# Patient Record
Sex: FEMALE | Race: WHITE
[De-identification: names, ages, dates, MRNs, and addresses within clinical notes are randomized per-mention and may not be internally consistent; named-entity substitution may affect disease eponyms.]

---

## 2017-09-28 ENCOUNTER — HOSPITAL ENCOUNTER (EMERGENCY)
Dept: HOSPITAL 9 - MADERS | Age: 77
Discharge: HOME | End: 2017-09-28
Payer: MEDICARE

## 2017-09-28 DIAGNOSIS — I10: ICD-10-CM

## 2017-09-28 DIAGNOSIS — H52.12: ICD-10-CM

## 2017-09-28 DIAGNOSIS — K59.00: Primary | ICD-10-CM

## 2017-09-28 LAB
BACTERIA UR QL AUTO: (no result) HPF
RBC UR QL AUTO: (no result) HPF (ref 0–3)
SP GR UR STRIP: 1.01 (ref 1–1.03)
WBC UR QL AUTO: (no result) HPF (ref 0–3)

## 2017-09-28 PROCEDURE — 81001 URINALYSIS AUTO W/SCOPE: CPT

## 2017-09-28 PROCEDURE — 74022 RADEX COMPL AQT ABD SERIES: CPT

## 2017-09-28 NOTE — RAD
TWO VIEWS ABDOMEN AND UPRIGHT VIEW OF THE CHEST:

 

COMPARISON: 

None.

 

HISTORY: 

Abdominal pain and constipation.

 

FINDINGS: 

Supine and upright views of the abdomen and upright view of the chest show a nonspecific, nonobstruc
dedra bowel gas pattern.  No free air or air fluid levels are seen on upright examination.  

 

There is scoliotic curvature of the spine.  Hardware is seen in the lumbar spine.

 

There is nodularity along the ribs of the right chest which may represent healing right rib fracture
s.  There is no evidence of consolidation, mass, or pleural effusion.

 

IMPRESSION: 

No evidence of obstruction.

 

POS: [facility]

## 2018-07-18 ENCOUNTER — HOSPITAL ENCOUNTER (EMERGENCY)
Dept: HOSPITAL 9 - MADERS | Age: 78
Discharge: HOME | End: 2018-07-18
Payer: MEDICARE

## 2018-07-18 DIAGNOSIS — S80.211A: ICD-10-CM

## 2018-07-18 DIAGNOSIS — I10: ICD-10-CM

## 2018-07-18 DIAGNOSIS — S50.811A: ICD-10-CM

## 2018-07-18 DIAGNOSIS — S50.812A: Primary | ICD-10-CM

## 2018-07-18 DIAGNOSIS — Z79.899: ICD-10-CM
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
## 2018-07-18 PROCEDURE — 99283 EMERGENCY DEPT VISIT LOW MDM: CPT
